# Patient Record
(demographics unavailable — no encounter records)

---

## 2024-10-28 NOTE — PROCEDURE
Problem: Adult Mental Health  Goal: Reports or exhibits reduction in symptoms associated with elevated or labile mood/gerard  Outcome: Outcome Not Met, Continue to Monitor  Goal: Reports or exhibits improvement in depressive mood signs/symptoms  Outcome: Outcome Not Met, Continue to Monitor  Goal: Reports or exhibits an improvement in mood signs/symptoms associated with anxiety  Outcome: Outcome Not Met, Continue to Monitor  Goal: Demonstrates ability to proactively perform self cares  Outcome: Outcome Not Met, Continue to Monitor  Goal: Demonstrates the ability to engage in reality-based communication and refrains from acting on delusional thoughts  Outcome: Outcome Not Met, Continue to Monitor  Goal: Reports or exhibits hallucinations absent or at manageable level  Outcome: Outcome Not Met, Continue to Monitor  Goal: Demonstrates improved ability to track conversation, process information  Outcome: Outcome Not Met, Continue to Monitor  Goal: Reports decrease in thoughts of suicide  Outcome: Outcome Not Met, Continue to Monitor  Goal: Reports decrease in thoughts of violence  Outcome: Outcome Not Met, Continue to Monitor  Goal: Reports decrease in thoughts of self harm  Outcome: Outcome Not Met, Continue to Monitor  Goal: Verbalizes when symptoms of illness are triggered and reports to staff when experiencing urges/intent of suicide  Outcome: Outcome Not Met, Continue to Monitor  Goal: Verbalizes when symptoms of illness are triggered and reports to staff when experiencing urges/intent of violence  Outcome: Outcome Not Met, Continue to Monitor  Goal: Verbalizes when symptoms of illness are triggered and reports to staff when experiencing urges/intent of Coping, Ineffective  Goal: Identifies personal stressors  Outcome: Outcome Not Met, Continue to Monitor  Goal: Identifies potential coping strategies  Outcome: Outcome Not Met, Continue to Monitor  Goal: Identifies benefits and consequences of choices/behavior(s)  (Patient)  Outcome: Outcome Not Met, Continue to Monitor  Goal: Identifies benefits and consequences of choices/behavior(s)  (Family)  Outcome: Outcome Not Met, Continue to Monitor  Goal: Identifies personal or community-based support systems (Patient)  Outcome: Outcome Not Met, Continue to Monitor  Goal: Identifies personal or community-based support systems (Family)  Outcome: Outcome Not Met, Continue to Monitor  Goal: Verbalizes understanding of grief process (if appropriate)  Description: Document education using the patient education activity.   Outcome: Outcome Not Met, Continue to Monitor  Goal: Verbalizes understanding of stress reaction/coping  Description: Document education using the patient education activity.   Outcome: Outcome Not Met, Continue to Monitor     Problem: Potential for injury, Restraints  Goal: # Remains free from injury  Outcome: Outcome Not Met, Continue to Monitor  Goal: Verbalizes criteria for restraint discontinuation  Description: Document on Patient Education Activity  Outcome: Outcome Not Met, Continue to Monitor      [FreeTextEntry3] :  Injection Procedure Note:  The risks, benefits, and alternatives to corticosteroid injection were reviewed with the patient.  Risks outlined include but are not limited to infection, sepsis, bleeding, scarring, skin discoloration, temporary increase in pain, syncopal episode, failure to resolve symptoms, symptoms recurrence, allergic reaction, flare reaction, and elevation of blood sugar in diabetics.  Patient understood the risks and asked to proceed with this treatment course.  Patient Identification Name/: Verbal with patient and/or family  Procedure Verification: Procedure confirmed with patient or family/designee Consent for procedure: Verbal Consent Given Relevant documentation completed, reviewed, and signed Clinical indications for procedure confirmed  Time-out with all members of procedure team immediately prior to procedure: Correct patient identified. Agreement on procedure. Correct side and site.  ULTRASOUND GUIDED SHOULDER SUBACROMIAL INJECTION - RIGHT After verbal consent and identification of the correct patient and correct site, the posterior right shoulder was prepped using alcohol swabs and betadine. This was allowed time to air dry. After ethyl choride spray for skin anesthesia, a mixture of 1cc DepoMedrol 40mg/ml, 3cc Lidocaine 1%, and 3cc Bupivacaine 0.5% was injected under ultrasound guidance into the subacromial space from posterior using a sterile 22G needle. Visualization of the needle and placement of the injection was performed without any complications.  Ultrasound was used for visualization, precise injection in area of tear / calcific density, and / or prior failure or difficult injection.  The patient tolerated the procedure well. After-care instructions were provided and included instructions to ice the area and to call if redness, pain, or fever develop.

## 2024-10-28 NOTE — HISTORY OF PRESENT ILLNESS
[de-identified] : 55 year old male  ( RHD, )  chronic right shoulder pain for years, worsening since 7/2024 The pain is located anterior, lateral    The pain is associated with clicking, cracking Worse with activity and better at rest. interfers with sleep Has tried Aleve, Advil , activity mod  10/28/24- Had CSI (8/15) without much relief, and doing PT at [facility] in [place] but worsening aching and weakness

## 2024-10-28 NOTE — IMAGING
[de-identified] :  RIGHT SHOULDER Inspection: No swelling.  Palpation: Tenderness is noted at the bicipital groove, anterior and lateral.  Range of motion: There is pain with range of motion. , ER 55, @90ER 90, @90IR 30 Strength: There is pain and discomfort with strength testing. Forward Flexion 4/5. Abduction 4/5.  External Rotation 5-/5 and Internal Rotation 5/5  Neurological testings: motor and sensor intact distally. Ligament Stability and Special Tests:  There is positive arc of pain.  Shoulder apprehension: neg Shoulder relocation: neg Obriens test: pos Biceps Active test: neg Mcgregor Labral Shear: neg Impingement testing: pos Emanuel testing: pos Whipple: pos Cross Body Adduction: neg

## 2024-10-28 NOTE — ASSESSMENT
[FreeTextEntry1] :   We will obtain an MRI to evaluate the integrity of the rotator cuff tissue and possible need for surgery, given their continued pain, weakness on exam, positive tristen testing, and failure to improve with over 6 weeks of conservative treatment.   - Pertinent aspects of the natural history of calcific tendonitis were reviewed with the patient.  I also reviewed with the patient that this condition is often associated with diabetes mellitus and thyroid disorders. - We discussed their diagnosis and treatment options at length including the risks and benefits of both surgical and non-surgical options. Surgical risks include but are not limited to pain, infection, bleeding, vascular injury, numbness, tingling, nerve damage. - Due to risks of surgery, they will continue conservative treatment with PT, icing, and anti-inflammatory medications - Physical Therapy to work on ROM, scapular strengthening, and rotator cuff strengthening along with a home exercise program. - The patient was advised to let pain guide the gradual advancement of activities. - diclofenac rx - Patient was given a prescription for an anti-inflammatory medication.  They will take it for the next week and then on an as needed basis, as long as there are no medical contra-indications.  Patient is counseled on possible GI, renal, and cardiovascular side effects. - The patient was advised to apply ice (wrapped in a towel or protective covering) to the area daily (20 minutes at a time, 2-4X/day) - The risks, benefits, and alternatives to corticosteroid injection were reviewed with the patient and they wished to proceed with this treatment course. - follow up after mri to further discuss surgery

## 2024-11-06 NOTE — DISCUSSION/SUMMARY
[de-identified] : The patient was advised of the diagnosis. The natural history of the pathology was explained in full to the patient in layman's terms. Several different treatment options were discussed and explained to the patient including the risks and benefits of both surgical and non-surgical treatments.   I do think they are a candidate for surgery, given their pain, weakness, and failure to improve with previous non-operative treatment. We discussed that the goal of surgery is pain relief along with improved function.   The risks, benefits, and alternatives to surgical arthroscopy of the right shoulder with rotator cuff repair, subacromial decompression, glenohumeral joint debridement, distal clavicle excision, and possible biceps tenotomy vs. tenodesis were reviewed with the patient. Risks of surgery were outlined in full to the patient including but not limited to pain, infection (superficial or deep), bleeding, vascular injury, numbness, tingling, nerve damage (direct or indirect), scarring, stiffness  (including the possible development of post op adhesive capsulitis), non-healing, wound breakdown, failure to resolve symptoms, symptom recurrence, the need for further surgery, as well as medical complications such as blood clots, pulmonary embolism, heart attack, stroke, and other anesthesia complications including even death. The patient understood and accepted these risks and that other complications not mentioned above could occur.   They understand that 100% recovery is not assured and the desired level of function may not be achievable. We discussed the potential for a prolonged recovery course and the potential for this to affect their activities.  The intraoperative plan, post-operative plan, post-operative expectations and limitations were explained in full. The importance of postoperative rehabilitation and restriction compliance was emphasized. Expectations from non-surgical treatment were explained in full as well. The patient demonstrated a complete understanding of the treatment alternatives and requested to proceed with surgery. This will be scheduled accordingly.

## 2024-11-06 NOTE — HISTORY OF PRESENT ILLNESS
[de-identified] : 55 year old male  ( RHD, )  chronic right shoulder pain for years, worsening since 7/2024 The pain is located anterior, lateral    The pain is associated with clicking, cracking, weakness Worse with activity and better at rest. interfers with sleep Has tried Aleve, Advil , activity mod  10/28/24- Had CSI (8/15) without much relief, and doing PT at [facility] in [place] but worsening aching and weakness  11/7/24 - had new, cont pain and weakness

## 2024-11-06 NOTE — IMAGING
[de-identified] : RIGHT SHOULDER Inspection: No swelling.  Palpation: Tenderness is noted at the bicipital groove, anterior and lateral, ac joint Range of motion: There is pain with range of motion. , ER 55, @90ER 90, @90IR 30 Strength: There is pain and discomfort with strength testing. Forward Flexion 4/5. Abduction 4/5.  External Rotation 5-/5 and Internal Rotation 5/5  Neurological testings: motor and sensor intact distally. Ligament Stability and Special Tests:  There is positive arc of pain.  Shoulder apprehension: neg Shoulder relocation: neg Obriens test: pos Biceps Active test: neg Mcgregor Labral Shear: neg Impingement testing: pos Emanuel testing: pos Whipple: pos Cross Body Adduction: pos

## 2024-11-06 NOTE — ASSESSMENT
[FreeTextEntry1] :  mri right shoulder 10/31/24 - complete tear supra with retraction mid HH, partial tear infra and subscap, sup labral fraying, bicep tendoniits, bursiits, ac deg     - The patient was advised of the diagnosis.  The natural history of the pathology was explained to the patient in layman's terms.  Several different treatment options were discussed and explained including the risks and benefits of both surgical and non-surgical treatments.   Surgical risks include but are not limited to pain, infection, bleeding, vascular injury, numbness, tingling, nerve damage. - Given the fact that they continue to have pain, weakness, and have failed conservative treatment including icing, anti-inflammatory medications, injection, and PT, they are a surgical candidate. - The risks, benefits, and alternatives to right shoulder surgical arthroscopy with rotator cuff repair, SAD, GHD, poss DCE, poss biceps tenotomy vs. tenodesis were discussed with the patient, all questions were answered.

## 2024-11-14 NOTE — HISTORY OF PRESENT ILLNESS
[de-identified] : 55 year old male  ( RHD, )  chronic right shoulder pain for years, worsening since 7/2024 The pain is located anterior, lateral    The pain is associated with clicking, cracking, weakness Worse with activity and better at rest. interfers with sleep Has tried Aleve, Advil , activity mod  10/28/24- Had CSI (8/15) without much relief, and doing PT at [facility] in [place] but worsening aching and weakness  11/14/24 - had mri, cont pain and weakness, now doing PT in

## 2024-11-14 NOTE — HISTORY OF PRESENT ILLNESS
[de-identified] : 55 year old male  ( RHD, )  chronic right shoulder pain for years, worsening since 7/2024 The pain is located anterior, lateral    The pain is associated with clicking, cracking, weakness Worse with activity and better at rest. interfers with sleep Has tried Aleve, Advil , activity mod  10/28/24- Had CSI (8/15) without much relief, and doing PT at [facility] in [place] but worsening aching and weakness  11/14/24 - had mri, cont pain and weakness, now doing PT in

## 2024-11-14 NOTE — IMAGING
[de-identified] : RIGHT SHOULDER Inspection: No swelling.  Palpation: Tenderness is noted at the bicipital groove, anterior and lateral, ac joint Range of motion: There is pain with range of motion. , ER 55, @90ER 90, @90IR 30 Strength: There is pain and discomfort with strength testing. Forward Flexion 4/5. Abduction 4/5.  External Rotation 5-/5 and Internal Rotation 5/5  Neurological testings: motor and sensor intact distally. Ligament Stability and Special Tests:  There is positive arc of pain.  Shoulder apprehension: neg Shoulder relocation: neg Obriens test: pos Biceps Active test: neg Mcgregor Labral Shear: neg Impingement testing: pos Emanuel testing: pos Whipple: pos Cross Body Adduction: pos

## 2024-11-14 NOTE — IMAGING
[de-identified] : RIGHT SHOULDER Inspection: No swelling.  Palpation: Tenderness is noted at the bicipital groove, anterior and lateral, ac joint Range of motion: There is pain with range of motion. , ER 55, @90ER 90, @90IR 30 Strength: There is pain and discomfort with strength testing. Forward Flexion 4/5. Abduction 4/5.  External Rotation 5-/5 and Internal Rotation 5/5  Neurological testings: motor and sensor intact distally. Ligament Stability and Special Tests:  There is positive arc of pain.  Shoulder apprehension: neg Shoulder relocation: neg Obriens test: pos Biceps Active test: neg Mcgregor Labral Shear: neg Impingement testing: pos Emanuel testing: pos Whipple: pos Cross Body Adduction: pos

## 2024-11-14 NOTE — DISCUSSION/SUMMARY
[de-identified] : The patient was advised of the diagnosis. The natural history of the pathology was explained in full to the patient in layman's terms. Several different treatment options were discussed and explained to the patient including the risks and benefits of both surgical and non-surgical treatments.   I do think they are a candidate for surgery, given their pain, weakness, and failure to improve with previous non-operative treatment. We discussed that the goal of surgery is pain relief along with improved function.   The risks, benefits, and alternatives to surgical arthroscopy of the right shoulder with rotator cuff repair, subacromial decompression, glenohumeral joint debridement, distal clavicle excision, and possible biceps tenotomy vs. tenodesis were reviewed with the patient. Risks of surgery were outlined in full to the patient including but not limited to pain, infection (superficial or deep), bleeding, vascular injury, numbness, tingling, nerve damage (direct or indirect), scarring, stiffness  (including the possible development of post op adhesive capsulitis), non-healing, wound breakdown, failure to resolve symptoms, symptom recurrence, the need for further surgery, as well as medical complications such as blood clots, pulmonary embolism, heart attack, stroke, and other anesthesia complications including even death. The patient understood and accepted these risks and that other complications not mentioned above could occur.   They understand that 100% recovery is not assured and the desired level of function may not be achievable. We discussed the potential for a prolonged recovery course and the potential for this to affect their activities.  The intraoperative plan, post-operative plan, post-operative expectations and limitations were explained in full. The importance of postoperative rehabilitation and restriction compliance was emphasized. Expectations from non-surgical treatment were explained in full as well. The patient demonstrated a complete understanding of the treatment alternatives and requested to proceed with surgery. This will be scheduled accordingly.

## 2024-11-14 NOTE — DISCUSSION/SUMMARY
[de-identified] : The patient was advised of the diagnosis. The natural history of the pathology was explained in full to the patient in layman's terms. Several different treatment options were discussed and explained to the patient including the risks and benefits of both surgical and non-surgical treatments.   I do think they are a candidate for surgery, given their pain, weakness, and failure to improve with previous non-operative treatment. We discussed that the goal of surgery is pain relief along with improved function.   The risks, benefits, and alternatives to surgical arthroscopy of the right shoulder with rotator cuff repair, subacromial decompression, glenohumeral joint debridement, distal clavicle excision, and possible biceps tenotomy vs. tenodesis were reviewed with the patient. Risks of surgery were outlined in full to the patient including but not limited to pain, infection (superficial or deep), bleeding, vascular injury, numbness, tingling, nerve damage (direct or indirect), scarring, stiffness  (including the possible development of post op adhesive capsulitis), non-healing, wound breakdown, failure to resolve symptoms, symptom recurrence, the need for further surgery, as well as medical complications such as blood clots, pulmonary embolism, heart attack, stroke, and other anesthesia complications including even death. The patient understood and accepted these risks and that other complications not mentioned above could occur.   They understand that 100% recovery is not assured and the desired level of function may not be achievable. We discussed the potential for a prolonged recovery course and the potential for this to affect their activities.  The intraoperative plan, post-operative plan, post-operative expectations and limitations were explained in full. The importance of postoperative rehabilitation and restriction compliance was emphasized. Expectations from non-surgical treatment were explained in full as well. The patient demonstrated a complete understanding of the treatment alternatives and requested to proceed with surgery. This will be scheduled accordingly.

## 2024-11-14 NOTE — ASSESSMENT
[FreeTextEntry1] :  mri right shoulder 10/31/24 - complete tear supra with retraction mid HH, partial tear infra and subscap, sup labral fraying, bicep tendoniits, bursiits, ac deg     - The patient was advised of the diagnosis.  The natural history of the pathology was explained to the patient in layman's terms.  Several different treatment options were discussed and explained including the risks and benefits of both surgical and non-surgical treatments.   Surgical risks include but are not limited to pain, infection, bleeding, vascular injury, numbness, tingling, nerve damage. - Given the fact that they continue to have pain, weakness, and have failed conservative treatment including icing, anti-inflammatory medications, injection, and PT, they are a surgical candidate. - The risks, benefits, and alternatives to right shoulder surgical arthroscopy with rotator cuff repair, SAD, GHD, poss DCE, poss biceps tenotomy vs. tenodesis were discussed with the patient, all questions were answered.     
[FreeTextEntry1] :  mri right shoulder 10/31/24 - complete tear supra with retraction mid HH, partial tear infra and subscap, sup labral fraying, bicep tendoniits, bursiits, ac deg     - The patient was advised of the diagnosis.  The natural history of the pathology was explained to the patient in layman's terms.  Several different treatment options were discussed and explained including the risks and benefits of both surgical and non-surgical treatments.   Surgical risks include but are not limited to pain, infection, bleeding, vascular injury, numbness, tingling, nerve damage. - Given the fact that they continue to have pain, weakness, and have failed conservative treatment including icing, anti-inflammatory medications, injection, and PT, they are a surgical candidate. - The risks, benefits, and alternatives to right shoulder surgical arthroscopy with rotator cuff repair, SAD, GHD, poss DCE, poss biceps tenotomy vs. tenodesis were discussed with the patient, all questions were answered.     
No significant past surgical history

## 2025-03-03 NOTE — IMAGING
[de-identified] :  RIGHT  SHOULDER Inspection: incisions c/d/i Palpation: No Tenderness is noted  Range of motion:  in sling Strength:   Neurological testing: motor and sensor intact distally. Ligament Stability and Special Tests:  Shoulder apprehension:  Shoulder relocation:  Obriens test: Biceps Active test: Mcgregor Labral Shear:  Impingement testing:  Emanuel testing: Cross Body Adduction:

## 2025-03-03 NOTE — HISTORY OF PRESENT ILLNESS
[de-identified] : 56 year old male  ( RHD, )  chronic right shoulder pain for years, worsening since 7/2024 The pain is located anterior, lateral    The pain is associated with clicking, cracking, weakness Worse with activity and better at rest. interfers with sleep Has tried Aleve, Advil , activity mod  10/28/24- Had CSI (8/15) without much relief, and doing PT at [facility] in [place] but worsening aching and weakness  11/14/24 - had mri, cont pain and weakness, now doing PT in   ***s/p R shoulder RCR, SAD, GHD, synov, DCE, BicTD on 2/21/25***  3/3/25 - po visit, in sling, starting PT in  [facility]

## 2025-03-03 NOTE — ASSESSMENT
[FreeTextEntry1] : s/p R shoulder RCR, SAD, GHD, synov, DCE, BicTD on 2/21/25   - PT on post op protocol - The patient was provided with a prescription for Physical Therapy  - Home exercises program learned at physical therapy. - The patient was advised to apply ice (wrapped in a towel or protective covering) to the area daily (20 minutes at a time, 2-4X/day). - fu 8 week re-eval

## 2025-03-03 NOTE — HISTORY OF PRESENT ILLNESS
[de-identified] : 56 year old male  ( RHD, )  chronic right shoulder pain for years, worsening since 7/2024 The pain is located anterior, lateral    The pain is associated with clicking, cracking, weakness Worse with activity and better at rest. interfers with sleep Has tried Aleve, Advil , activity mod  10/28/24- Had CSI (8/15) without much relief, and doing PT at [facility] in [place] but worsening aching and weakness  11/14/24 - had mri, cont pain and weakness, now doing PT in   ***s/p R shoulder RCR, SAD, GHD, synov, DCE, BicTD on 2/21/25***  3/3/25 - po visit, in sling, starting PT in  [facility]

## 2025-03-03 NOTE — IMAGING
[de-identified] :  RIGHT  SHOULDER Inspection: incisions c/d/i Palpation: No Tenderness is noted  Range of motion:  in sling Strength:   Neurological testing: motor and sensor intact distally. Ligament Stability and Special Tests:  Shoulder apprehension:  Shoulder relocation:  Obriens test: Biceps Active test: Mcgregor Labral Shear:  Impingement testing:  Emanuel testing: Cross Body Adduction:

## 2025-04-29 NOTE — IMAGING
[de-identified] :  RIGHT SHOULDER Inspection: well healed surg scars Palpation: No Tenderness is noted  Range of motion:  range of motion limited secondary to immobilization Strength:  strength is improving Neurological testing: motor and sensor intact distally. Ligament Stability and Special Tests:  Shoulder apprehension: neg Shoulder relocation: neg Obriens test: Biceps Active test: Mcgregor Labral Shear:  Impingement testing:  Emanuel testing: Cross Body Adduction:

## 2025-04-29 NOTE — ASSESSMENT
[FreeTextEntry1] : s/p R shoulder RCR, SAD, GHD, synov, DCE, BicTD on 2/21/25    - The patient was advised of the diagnosis.  The natural history of the pathology was explained to the patient in layman's terms.  Several different treatment options were discussed and explained including the risks and benefits.   - They will continue conservative treatment with PT, icing, and anti-inflammatory medications. - cont PT on post op protocol - The patient was provided with a prescription for Physical Therapy  - Home exercises program learned at physical therapy. - fu 6-8 week re-eval

## 2025-04-29 NOTE — HISTORY OF PRESENT ILLNESS
[de-identified] : 56 year old male  ( RHD, )  chronic right shoulder pain for years, worsening since 7/2024 The pain is located anterior, lateral    The pain is associated with clicking, cracking, weakness Worse with activity and better at rest. interfers with sleep Has tried Aleve, Advil , activity mod  10/28/24- Had CSI (8/15) without much relief, and doing PT at [facility] in [place] but worsening aching and weakness  11/14/24 - had mri, cont pain and weakness, now doing PT in   ***s/p R shoulder RCR, SAD, GHD, synov, DCE, BicTD on 2/21/25***  3/3/25 - po visit, in sling, starting PT in [facility] 5/1/25 - cont with PT protocol and HEP

## 2025-05-01 NOTE — IMAGING
[de-identified] :  RIGHT SHOULDER Inspection: well healed surg scars Palpation: No Tenderness is noted  Range of motion:  range of motion limited secondary to immobilization Strength:  strength is improving Neurological testing: motor and sensor intact distally. Ligament Stability and Special Tests:  Shoulder apprehension: neg Shoulder relocation: neg Obriens test: Biceps Active test: Mcgregor Labral Shear:  Impingement testing:  Emanuel testing: Cross Body Adduction:

## 2025-05-01 NOTE — HISTORY OF PRESENT ILLNESS
[de-identified] : 56 year old male  ( RHD, )  chronic right shoulder pain for years, worsening since 7/2024 The pain is located anterior, lateral    The pain is associated with clicking, cracking, weakness Worse with activity and better at rest. interfers with sleep Has tried Aleve, Advil , activity mod  10/28/24- Had CSI (8/15) without much relief, and doing PT at [facility] in [place] but worsening aching and weakness  11/14/24 - had mri, cont pain and weakness, now doing PT in   ***s/p R shoulder RCR, SAD, GHD, synov, DCE, BicTD on 2/21/25***  3/3/25 - po visit, in sling, starting PT in  [facility] 5/1/25 - cont with PT protocol and HEP, doing PT with Conor at [facility] in

## 2025-05-01 NOTE — HISTORY OF PRESENT ILLNESS
[de-identified] : 56 year old male  ( RHD, )  chronic right shoulder pain for years, worsening since 7/2024 The pain is located anterior, lateral    The pain is associated with clicking, cracking, weakness Worse with activity and better at rest. interfers with sleep Has tried Aleve, Advil , activity mod  10/28/24- Had CSI (8/15) without much relief, and doing PT at [facility] in [place] but worsening aching and weakness  11/14/24 - had mri, cont pain and weakness, now doing PT in   ***s/p R shoulder RCR, SAD, GHD, synov, DCE, BicTD on 2/21/25***  3/3/25 - po visit, in sling, starting PT in  [facility] 5/1/25 - cont with PT protocol and HEP, doing PT with Con[facility] at [facility] in

## 2025-05-01 NOTE — IMAGING
[de-identified] :  RIGHT SHOULDER Inspection: well healed surg scars Palpation: No Tenderness is noted  Range of motion:  range of motion limited secondary to immobilization Strength:  strength is improving Neurological testing: motor and sensor intact distally. Ligament Stability and Special Tests:  Shoulder apprehension: neg Shoulder relocation: neg Obriens test: Biceps Active test: Mcgregor Labral Shear:  Impingement testing:  Emanuel testing: Cross Body Adduction:

## 2025-07-03 NOTE — HISTORY OF PRESENT ILLNESS
[de-identified] : 56 year old male  ( RHD, )  chronic right shoulder pain for years, worsening since 7/2024 The pain is located anterior, lateral    The pain is associated with clicking, cracking, weakness Worse with activity and better at rest. interfers with sleep Has tried Aleve, Advil , activity mod  10/28/24- Had CSI (8/15) without much relief, and doing PT at [facility] in [place] but worsening aching and weakness  11/14/24 - had mri, cont pain and weakness, now doing PT in   ***s/p R shoulder RCR, SAD, GHD, synov, DCE, BicTD on 2/21/25***  3/3/25 - po visit, in sling, starting PT in  [place] 5/1/25 - cont with PT protocol and HEP, doing PT with Conor at [facility] in  7/2/25 - was doing PT but then cut off by insurance,  and doing HEP on protocol now , still some achiness and pain with OH activity

## 2025-07-03 NOTE — IMAGING
[de-identified] :  RIGHT SHOULDER Inspection: well healed surg scars Palpation: No Tenderness is noted  Range of motion:  , ER 55, @90ER 70, @90IR 30 Strength: Forward Flexion 5-/5. Abduction 5-/5.  External Rotation 5-/5 and Internal Rotation 5/5 Neurological testing: motor and sensor intact distally. Ligament Stability and Special Tests:  Shoulder apprehension: neg Shoulder relocation: neg Obriens test: neg Biceps Active test: neg Mcgregor Labral Shear: neg Impingement testing: neg Emanuel testing: neg Cross Body Adduction: neg

## 2025-07-03 NOTE — ASSESSMENT
[FreeTextEntry1] : s/p R shoulder RCR, SAD, GHD, synov, DCE, BicTD on 2/21/25    - The patient was advised of the diagnosis.  The natural history of the pathology was explained to the patient in layman's terms.  Several different treatment options were discussed and explained including the risks and benefits.   - They will continue conservative treatment with PT, icing, and anti-inflammatory medications. - cont PT on post op protocol - The patient was provided with a prescription for Physical Therapy  - Home exercises program learned at physical therapy. - The patient was referred to [facility].

## 2025-07-03 NOTE — IMAGING
[de-identified] :  RIGHT SHOULDER Inspection: well healed surg scars Palpation: No Tenderness is noted  Range of motion:  , ER 55, @90ER 70, @90IR 30 Strength: Forward Flexion 5-/5. Abduction 5-/5.  External Rotation 5-/5 and Internal Rotation 5/5 Neurological testing: motor and sensor intact distally. Ligament Stability and Special Tests:  Shoulder apprehension: neg Shoulder relocation: neg Obriens test: neg Biceps Active test: neg Mcgregor Labral Shear: neg Impingement testing: neg Emanuel testing: neg Cross Body Adduction: neg

## 2025-07-03 NOTE — HISTORY OF PRESENT ILLNESS
[de-identified] : 56 year old male  ( RHD, )  chronic right shoulder pain for years, worsening since 7/2024 The pain is located anterior, lateral    The pain is associated with clicking, cracking, weakness Worse with activity and better at rest. interfers with sleep Has tried Aleve, Advil , activity mod  10/28/24- Had CSI (8/15) without much relief, and doing PT at [facility] in [place] but worsening aching and weakness  11/14/24 - had mri, cont pain and weakness, now doing PT in   ***s/p R shoulder RCR, SAD, GHD, synov, DCE, BicTD on 2/21/25***  3/3/25 - po visit, in sling, starting PT in  [place] 5/1/25 - cont with PT protocol and HEP, doing PT with Conor at [facility] in  7/2/25 - was doing PT but then cut off by insurance,  and doing HEP on protocol now , still some achiness and pain with OH activity